# Patient Record
Sex: MALE | Race: WHITE
[De-identification: names, ages, dates, MRNs, and addresses within clinical notes are randomized per-mention and may not be internally consistent; named-entity substitution may affect disease eponyms.]

---

## 2021-11-23 ENCOUNTER — HOSPITAL ENCOUNTER (EMERGENCY)
Dept: HOSPITAL 54 - ER | Age: 66
Discharge: ANOTHER HOSPITAL | End: 2021-11-23
Payer: COMMERCIAL

## 2021-11-23 VITALS — WEIGHT: 203 LBS | BODY MASS INDEX: 24.72 KG/M2 | HEIGHT: 76 IN

## 2021-11-23 VITALS — SYSTOLIC BLOOD PRESSURE: 114 MMHG | DIASTOLIC BLOOD PRESSURE: 70 MMHG

## 2021-11-23 DIAGNOSIS — D72.829: ICD-10-CM

## 2021-11-23 DIAGNOSIS — I50.9: ICD-10-CM

## 2021-11-23 DIAGNOSIS — Z94.0: ICD-10-CM

## 2021-11-23 DIAGNOSIS — N17.9: ICD-10-CM

## 2021-11-23 DIAGNOSIS — Z79.4: ICD-10-CM

## 2021-11-23 DIAGNOSIS — N18.9: ICD-10-CM

## 2021-11-23 DIAGNOSIS — E11.22: ICD-10-CM

## 2021-11-23 DIAGNOSIS — J18.9: Primary | ICD-10-CM

## 2021-11-23 DIAGNOSIS — I82.413: ICD-10-CM

## 2021-11-23 DIAGNOSIS — Z20.822: ICD-10-CM

## 2021-11-23 DIAGNOSIS — T86.19: ICD-10-CM

## 2021-11-23 DIAGNOSIS — Z88.0: ICD-10-CM

## 2021-11-23 DIAGNOSIS — R53.1: ICD-10-CM

## 2021-11-23 DIAGNOSIS — E88.09: ICD-10-CM

## 2021-11-23 DIAGNOSIS — Q61.3: ICD-10-CM

## 2021-11-23 DIAGNOSIS — Z85.820: ICD-10-CM

## 2021-11-23 DIAGNOSIS — D50.9: ICD-10-CM

## 2021-11-23 DIAGNOSIS — D69.6: ICD-10-CM

## 2021-11-23 DIAGNOSIS — Z79.899: ICD-10-CM

## 2021-11-23 DIAGNOSIS — Z88.8: ICD-10-CM

## 2021-11-23 LAB
ALBUMIN SERPL BCP-MCNC: 1.8 G/DL (ref 3.4–5)
ALP SERPL-CCNC: 176 U/L (ref 46–116)
ALT SERPL W P-5'-P-CCNC: 67 U/L (ref 12–78)
AST SERPL W P-5'-P-CCNC: 123 U/L (ref 15–37)
BASOPHILS # BLD AUTO: 0.1 K/UL (ref 0–0.2)
BASOPHILS NFR BLD AUTO: 0.7 % (ref 0–2)
BILIRUB DIRECT SERPL-MCNC: 0.4 MG/DL (ref 0–0.2)
BILIRUB SERPL-MCNC: 0.9 MG/DL (ref 0.2–1)
BUN SERPL-MCNC: 75 MG/DL (ref 7–18)
CALCIUM SERPL-MCNC: 7.6 MG/DL (ref 8.5–10.1)
CHLORIDE SERPL-SCNC: 100 MMOL/L (ref 98–107)
CK SERPL-CCNC: 213 U/L (ref 39–308)
CO2 SERPL-SCNC: 21 MMOL/L (ref 21–32)
CREAT SERPL-MCNC: 2 MG/DL (ref 0.6–1.3)
EOSINOPHIL NFR BLD AUTO: 0.3 % (ref 0–6)
GLUCOSE SERPL-MCNC: 105 MG/DL (ref 74–106)
HCT VFR BLD AUTO: 32 % (ref 39–51)
HGB BLD-MCNC: 10.3 G/DL (ref 13.5–17.5)
LYMPHOCYTES NFR BLD AUTO: 0.3 K/UL (ref 0.8–4.8)
LYMPHOCYTES NFR BLD AUTO: 2.3 % (ref 20–44)
MCHC RBC AUTO-ENTMCNC: 32 G/DL (ref 31–36)
MCV RBC AUTO: 77 FL (ref 80–96)
MONOCYTES NFR BLD AUTO: 0.7 K/UL (ref 0.1–1.3)
MONOCYTES NFR BLD AUTO: 5.8 % (ref 2–12)
NEUTROPHILS # BLD AUTO: 11.3 K/UL (ref 1.8–8.9)
NEUTROPHILS NFR BLD AUTO: 90.9 % (ref 43–81)
PLATELET # BLD AUTO: 149 K/UL (ref 150–450)
POTASSIUM SERPL-SCNC: 4.3 MMOL/L (ref 3.5–5.1)
PROT SERPL-MCNC: 5.1 G/DL (ref 6.4–8.2)
RBC # BLD AUTO: 4.16 MIL/UL (ref 4.5–6)
SODIUM SERPL-SCNC: 131 MMOL/L (ref 136–145)
WBC NRBC COR # BLD AUTO: 12.4 K/UL (ref 4.3–11)

## 2021-11-23 PROCEDURE — 87040 BLOOD CULTURE FOR BACTERIA: CPT

## 2021-11-23 PROCEDURE — 82962 GLUCOSE BLOOD TEST: CPT

## 2021-11-23 PROCEDURE — 93005 ELECTROCARDIOGRAM TRACING: CPT

## 2021-11-23 PROCEDURE — 87077 CULTURE AEROBIC IDENTIFY: CPT

## 2021-11-23 PROCEDURE — 83605 ASSAY OF LACTIC ACID: CPT

## 2021-11-23 PROCEDURE — 36415 COLL VENOUS BLD VENIPUNCTURE: CPT

## 2021-11-23 PROCEDURE — 96367 TX/PROPH/DG ADDL SEQ IV INF: CPT

## 2021-11-23 PROCEDURE — 93971 EXTREMITY STUDY: CPT

## 2021-11-23 PROCEDURE — 96361 HYDRATE IV INFUSION ADD-ON: CPT

## 2021-11-23 PROCEDURE — 84484 ASSAY OF TROPONIN QUANT: CPT

## 2021-11-23 PROCEDURE — 80076 HEPATIC FUNCTION PANEL: CPT

## 2021-11-23 PROCEDURE — 87081 CULTURE SCREEN ONLY: CPT

## 2021-11-23 PROCEDURE — C9803 HOPD COVID-19 SPEC COLLECT: HCPCS

## 2021-11-23 PROCEDURE — 87186 SC STD MICRODIL/AGAR DIL: CPT

## 2021-11-23 PROCEDURE — 99291 CRITICAL CARE FIRST HOUR: CPT

## 2021-11-23 PROCEDURE — 96365 THER/PROPH/DIAG IV INF INIT: CPT

## 2021-11-23 PROCEDURE — 80048 BASIC METABOLIC PNL TOTAL CA: CPT

## 2021-11-23 PROCEDURE — 85025 COMPLETE CBC W/AUTO DIFF WBC: CPT

## 2021-11-23 PROCEDURE — 93970 EXTREMITY STUDY: CPT

## 2021-11-23 PROCEDURE — 87426 SARSCOV CORONAVIRUS AG IA: CPT

## 2021-11-23 PROCEDURE — 83880 ASSAY OF NATRIURETIC PEPTIDE: CPT

## 2021-11-23 PROCEDURE — 71045 X-RAY EXAM CHEST 1 VIEW: CPT

## 2021-11-23 PROCEDURE — 82550 ASSAY OF CK (CPK): CPT

## 2021-11-23 NOTE — NUR
TRANSFER INFO: PT GOING TO [facility], ACCEPTED BY NANCY CASTANO. PT 
GOING TO ROOM 5316-B, [facility] [id number], RN FOR REPORT [phone]

## 2021-11-23 NOTE — NUR
Patient deann77, from home, c/o weakness since last night and low BP. On room 
air, connected to the monitor and pulse ox. kept comfortable, will continue to 
monitor accordingly.

## 2021-11-23 NOTE — NUR
-------------------------------------------------------------------------------

          *** Note undone in [facility] - 11/23/21 at 1449 by GREG ***          

-------------------------------------------------------------------------------

PAGED EPIC.